# Patient Record
Sex: MALE | Race: WHITE | NOT HISPANIC OR LATINO | URBAN - METROPOLITAN AREA
[De-identification: names, ages, dates, MRNs, and addresses within clinical notes are randomized per-mention and may not be internally consistent; named-entity substitution may affect disease eponyms.]

---

## 2019-05-28 ENCOUNTER — APPOINTMENT (RX ONLY)
Dept: URBAN - METROPOLITAN AREA CLINIC 259 | Facility: CLINIC | Age: 59
Setting detail: DERMATOLOGY
End: 2019-05-28

## 2019-05-28 VITALS — RESPIRATION RATE: 20 BRPM | WEIGHT: 240 LBS | HEIGHT: 70 IN

## 2019-05-28 DIAGNOSIS — B35.1 TINEA UNGUIUM: ICD-10-CM

## 2019-05-28 DIAGNOSIS — L72.8 OTHER FOLLICULAR CYSTS OF THE SKIN AND SUBCUTANEOUS TISSUE: ICD-10-CM

## 2019-05-28 DIAGNOSIS — L23.9 ALLERGIC CONTACT DERMATITIS, UNSPECIFIED CAUSE: ICD-10-CM

## 2019-05-28 DIAGNOSIS — D22 MELANOCYTIC NEVI: ICD-10-CM

## 2019-05-28 DIAGNOSIS — B35.3 TINEA PEDIS: ICD-10-CM

## 2019-05-28 DIAGNOSIS — L81.4 OTHER MELANIN HYPERPIGMENTATION: ICD-10-CM

## 2019-05-28 DIAGNOSIS — L57.0 ACTINIC KERATOSIS: ICD-10-CM

## 2019-05-28 DIAGNOSIS — D18.0 HEMANGIOMA: ICD-10-CM

## 2019-05-28 PROBLEM — D22.62 MELANOCYTIC NEVI OF LEFT UPPER LIMB, INCLUDING SHOULDER: Status: ACTIVE | Noted: 2019-05-28

## 2019-05-28 PROBLEM — L23.7 ALLERGIC CONTACT DERMATITIS DUE TO PLANTS, EXCEPT FOOD: Status: ACTIVE | Noted: 2019-05-28

## 2019-05-28 PROBLEM — E03.9 HYPOTHYROIDISM, UNSPECIFIED: Status: ACTIVE | Noted: 2019-05-28

## 2019-05-28 PROBLEM — I10 ESSENTIAL (PRIMARY) HYPERTENSION: Status: ACTIVE | Noted: 2019-05-28

## 2019-05-28 PROBLEM — D22.9 MELANOCYTIC NEVI, UNSPECIFIED: Status: ACTIVE | Noted: 2019-05-28

## 2019-05-28 PROBLEM — D18.01 HEMANGIOMA OF SKIN AND SUBCUTANEOUS TISSUE: Status: ACTIVE | Noted: 2019-05-28

## 2019-05-28 PROCEDURE — ? TREATMENT REGIMEN

## 2019-05-28 PROCEDURE — 17003 DESTRUCT PREMALG LES 2-14: CPT

## 2019-05-28 PROCEDURE — ? COUNSELING

## 2019-05-28 PROCEDURE — ? LIQUID NITROGEN

## 2019-05-28 PROCEDURE — 99203 OFFICE O/P NEW LOW 30 MIN: CPT | Mod: 25

## 2019-05-28 PROCEDURE — ? PRESCRIPTION

## 2019-05-28 PROCEDURE — 17000 DESTRUCT PREMALG LESION: CPT

## 2019-05-28 PROCEDURE — ? INVENTORY

## 2019-05-28 RX ORDER — MOMETASONE FUROATE 1 MG/G
CREAM TOPICAL
Qty: 1 | Refills: 1 | Status: ERX | COMMUNITY
Start: 2019-05-28

## 2019-05-28 RX ORDER — ECONAZOLE NITRATE 10 MG/G
CREAM TOPICAL
Qty: 1 | Refills: 1 | Status: ERX | COMMUNITY
Start: 2019-05-28

## 2019-05-28 RX ADMIN — ECONAZOLE NITRATE: 10 CREAM TOPICAL at 13:04

## 2019-05-28 RX ADMIN — MOMETASONE FUROATE: 1 CREAM TOPICAL at 13:06

## 2019-05-28 ASSESSMENT — LOCATION SIMPLE DESCRIPTION DERM
LOCATION SIMPLE: RIGHT GREAT TOE
LOCATION SIMPLE: UPPER BACK
LOCATION SIMPLE: LEFT FOREARM
LOCATION SIMPLE: RIGHT PLANTAR SURFACE
LOCATION SIMPLE: RIGHT EAR
LOCATION SIMPLE: LEFT CHEEK
LOCATION SIMPLE: LEFT FOREHEAD
LOCATION SIMPLE: CHEST
LOCATION SIMPLE: RIGHT FOREHEAD
LOCATION SIMPLE: LEFT PRETIBIAL REGION
LOCATION SIMPLE: LEFT PLANTAR SURFACE

## 2019-05-28 ASSESSMENT — LOCATION DETAILED DESCRIPTION DERM
LOCATION DETAILED: LEFT DISTAL PRETIBIAL REGION
LOCATION DETAILED: LEFT PLANTAR FOREFOOT OVERLYING 4TH METATARSAL
LOCATION DETAILED: LEFT VENTRAL DISTAL FOREARM
LOCATION DETAILED: RIGHT SUPERIOR CRUS OF ANTIHELIX
LOCATION DETAILED: RIGHT DISTAL PLANTAR GREAT TOE
LOCATION DETAILED: LEFT CENTRAL MALAR CHEEK
LOCATION DETAILED: RIGHT INFERIOR MEDIAL FOREHEAD
LOCATION DETAILED: LEFT INFERIOR FOREHEAD
LOCATION DETAILED: RIGHT MEDIAL SUPERIOR CHEST
LOCATION DETAILED: LEFT INFERIOR MEDIAL FOREHEAD
LOCATION DETAILED: RIGHT LATERAL PLANTAR MIDFOOT
LOCATION DETAILED: LEFT FOREHEAD
LOCATION DETAILED: INFERIOR THORACIC SPINE

## 2019-05-28 ASSESSMENT — LOCATION ZONE DERM
LOCATION ZONE: EAR
LOCATION ZONE: TOE
LOCATION ZONE: LEG
LOCATION ZONE: ARM
LOCATION ZONE: FEET
LOCATION ZONE: TRUNK
LOCATION ZONE: FACE

## 2019-05-28 NOTE — PROCEDURE: TREATMENT REGIMEN
Otc Regimen: Vinegar soak add a quarter cup of vinegar to a bucket of water and soak QHS
Initiate Treatment: Apply spectazole cream after vinegar soaks
Detail Level: Zone